# Patient Record
Sex: FEMALE | Race: WHITE
[De-identification: names, ages, dates, MRNs, and addresses within clinical notes are randomized per-mention and may not be internally consistent; named-entity substitution may affect disease eponyms.]

---

## 2018-07-11 ENCOUNTER — HOSPITAL ENCOUNTER (EMERGENCY)
Dept: HOSPITAL 59 - ER | Age: 28
LOS: 1 days | Discharge: HOME | End: 2018-07-12
Payer: MEDICAID

## 2018-07-11 DIAGNOSIS — S16.1XXA: ICD-10-CM
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
## 2018-07-11 DIAGNOSIS — R10.11: ICD-10-CM

## 2018-07-11 DIAGNOSIS — S06.0X9A: Primary | ICD-10-CM
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
## 2018-07-11 DIAGNOSIS — S29.012A: ICD-10-CM

## 2018-07-11 LAB
ALBUMIN SERPL-MCNC: 4.1 G/DL (ref 4–5)
ALBUMIN/GLOB SERPL: 1.6 {RATIO} (ref 1.1–1.8)
ALP SERPL-CCNC: 55 U/L (ref 35–104)
ALT SERPL-CCNC: 13 U/L (ref ?–33)
ANION GAP SERPL CALC-SCNC: 13 MMOL/L (ref 7–16)
APTT BLD: 28.7 SECONDS (ref 24.5–39.1)
AST SERPL-CCNC: 18 U/L (ref 10–35)
BASOPHILS NFR BLD: 0.1 % (ref 0–6)
BILIRUB SERPL-MCNC: 0.3 MG/DL (ref 0.2–1)
BUN SERPL-MCNC: 12 MG/DL (ref 6–20)
CO2 SERPL-SCNC: 23 MMOL/L (ref 22–29)
CREAT SERPL-MCNC: 0.5 MG/DL (ref 0.5–0.9)
EOSINOPHIL NFR BLD: 0.8 % (ref 0–6)
ERYTHROCYTE [DISTWIDTH] IN BLOOD BY AUTOMATED COUNT: 12.5 % (ref 11.5–14.5)
EST GLOMERULAR FILTRATION RATE: > 60 ML/MIN
GLOBULIN SER-MCNC: 2.6 GM/DL (ref 1.4–4.8)
GLUCOSE SERPL-MCNC: 154 MG/DL (ref 74–109)
GRANULOCYTES NFR BLD: 67.4 % (ref 47–80)
HCT VFR BLD CALC: 33.5 % (ref 35–47)
HGB BLD-MCNC: 11.2 GM/DL (ref 11.6–16)
INR PPP: 1
LYMPHOCYTES NFR BLD AUTO: 25.2 % (ref 16–45)
MCH RBC QN AUTO: 33.1 PG (ref 27–33)
MCHC RBC AUTO-ENTMCNC: 33.4 G/DL (ref 32–36)
MCV RBC AUTO: 99.1 FL (ref 81–97)
MONOCYTES NFR BLD: 6.5 % (ref 0–9)
PLATELET # BLD: 142 K/UL (ref 130–400)
PMV BLD AUTO: 10 FL (ref 7.4–10.4)
PROT SERPL-MCNC: 6.7 G/DL (ref 6.6–8.7)
PROTHROMBIN TIME: 10.7 SECONDS (ref 9.5–12.1)
RBC # BLD AUTO: 3.38 M/UL (ref 3.8–5.4)
WBC # BLD AUTO: 7.1 K/UL (ref 4.2–12.2)

## 2018-07-11 PROCEDURE — 85730 THROMBOPLASTIN TIME PARTIAL: CPT

## 2018-07-11 PROCEDURE — 80053 COMPREHEN METABOLIC PANEL: CPT

## 2018-07-11 PROCEDURE — 71260 CT THORAX DX C+: CPT

## 2018-07-11 PROCEDURE — 72125 CT NECK SPINE W/O DYE: CPT

## 2018-07-11 PROCEDURE — 96374 THER/PROPH/DIAG INJ IV PUSH: CPT

## 2018-07-11 PROCEDURE — 96361 HYDRATE IV INFUSION ADD-ON: CPT

## 2018-07-11 PROCEDURE — 74177 CT ABD & PELVIS W/CONTRAST: CPT

## 2018-07-11 PROCEDURE — 84703 CHORIONIC GONADOTROPIN ASSAY: CPT

## 2018-07-11 PROCEDURE — 85610 PROTHROMBIN TIME: CPT

## 2018-07-11 PROCEDURE — 99284 EMERGENCY DEPT VISIT MOD MDM: CPT

## 2018-07-11 PROCEDURE — 70450 CT HEAD/BRAIN W/O DYE: CPT

## 2018-07-11 PROCEDURE — 85025 COMPLETE CBC W/AUTO DIFF WBC: CPT
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
## 2018-09-03 ENCOUNTER — HOSPITAL ENCOUNTER (EMERGENCY)
Dept: HOSPITAL 59 - ER | Age: 28
Discharge: HOME | End: 2018-09-03
Payer: MEDICARE

## 2018-09-03 DIAGNOSIS — K08.89: Primary | ICD-10-CM

## 2018-09-03 DIAGNOSIS — K08.409: ICD-10-CM

## 2018-09-03 DIAGNOSIS — Z87.891: ICD-10-CM

## 2018-09-03 LAB
BASOPHILS NFR BLD: 0.1 % (ref 0–6)
EOSINOPHIL NFR BLD: 0.4 % (ref 0–6)
ERYTHROCYTE [DISTWIDTH] IN BLOOD BY AUTOMATED COUNT: 12.7 % (ref 11.5–14.5)
GRANULOCYTES NFR BLD: 66.9 % (ref 47–80)
HCT VFR BLD CALC: 37.9 % (ref 35–47)
HGB BLD-MCNC: 12.3 GM/DL (ref 11.6–16)
LYMPHOCYTES NFR BLD AUTO: 25.1 % (ref 16–45)
MCH RBC QN AUTO: 31.8 PG (ref 27–33)
MCHC RBC AUTO-ENTMCNC: 32.5 G/DL (ref 32–36)
MCV RBC AUTO: 97.9 FL (ref 81–97)
MONOCYTES NFR BLD: 7.5 % (ref 0–9)
PLATELET # BLD: 152 K/UL (ref 130–400)
PMV BLD AUTO: 10.1 FL (ref 7.4–10.4)
RBC # BLD AUTO: 3.87 M/UL (ref 3.8–5.4)
WBC # BLD AUTO: 6.7 K/UL (ref 4.2–12.2)

## 2018-09-03 PROCEDURE — 99283 EMERGENCY DEPT VISIT LOW MDM: CPT

## 2018-09-03 PROCEDURE — 85025 COMPLETE CBC W/AUTO DIFF WBC: CPT
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
## 2018-12-16 ENCOUNTER — HOSPITAL ENCOUNTER (EMERGENCY)
Dept: HOSPITAL 59 - ER | Age: 28
Discharge: HOME | End: 2018-12-16
Payer: MEDICARE

## 2018-12-16 DIAGNOSIS — J01.00: Primary | ICD-10-CM

## 2018-12-16 DIAGNOSIS — R51: ICD-10-CM

## 2018-12-16 DIAGNOSIS — Z87.891: ICD-10-CM

## 2018-12-16 LAB
ALBUMIN SERPL-MCNC: 4.4 G/DL (ref 4–5)
ALBUMIN/GLOB SERPL: 1.3 {RATIO} (ref 1.1–1.8)
ALP SERPL-CCNC: 62 U/L (ref 35–104)
ALT SERPL-CCNC: 30 U/L (ref ?–33)
ANION GAP SERPL CALC-SCNC: 15 MMOL/L (ref 7–16)
AST SERPL-CCNC: 33 U/L (ref 10–35)
BASOPHILS NFR BLD: 0.2 % (ref 0–6)
BILIRUB SERPL-MCNC: 0.2 MG/DL (ref 0.2–1)
BUN SERPL-MCNC: 10 MG/DL (ref 6–20)
CO2 SERPL-SCNC: 27 MMOL/L (ref 22–29)
CREAT SERPL-MCNC: 0.6 MG/DL (ref 0.5–0.9)
EOSINOPHIL NFR BLD: 0.2 % (ref 0–6)
ERYTHROCYTE [DISTWIDTH] IN BLOOD BY AUTOMATED COUNT: 13.4 % (ref 11.5–14.5)
EST GLOMERULAR FILTRATION RATE: > 60 ML/MIN
GLOBULIN SER-MCNC: 3.5 GM/DL (ref 1.4–4.8)
GLUCOSE SERPL-MCNC: 88 MG/DL (ref 74–109)
GRANULOCYTES NFR BLD: 72.2 % (ref 47–80)
HCT VFR BLD CALC: 38.7 % (ref 35–47)
HGB BLD-MCNC: 12.3 GM/DL (ref 11.6–16)
LYMPHOCYTES NFR BLD AUTO: 21.2 % (ref 16–45)
MCH RBC QN AUTO: 31.3 PG (ref 27–33)
MCHC RBC AUTO-ENTMCNC: 31.8 G/DL (ref 32–36)
MCV RBC AUTO: 98.5 FL (ref 81–97)
MONOCYTES NFR BLD: 6.2 % (ref 0–9)
PLATELET # BLD: 243 K/UL (ref 130–400)
PMV BLD AUTO: 9.3 FL (ref 7.4–10.4)
PROT SERPL-MCNC: 7.9 G/DL (ref 6.6–8.7)
RBC # BLD AUTO: 3.93 M/UL (ref 3.8–5.4)
WBC # BLD AUTO: 10.3 K/UL (ref 4.2–12.2)

## 2018-12-16 PROCEDURE — 80053 COMPREHEN METABOLIC PANEL: CPT

## 2018-12-16 PROCEDURE — 96375 TX/PRO/DX INJ NEW DRUG ADDON: CPT

## 2018-12-16 PROCEDURE — 70486 CT MAXILLOFACIAL W/O DYE: CPT

## 2018-12-16 PROCEDURE — 96361 HYDRATE IV INFUSION ADD-ON: CPT

## 2018-12-16 PROCEDURE — 85025 COMPLETE CBC W/AUTO DIFF WBC: CPT

## 2018-12-16 PROCEDURE — 99284 EMERGENCY DEPT VISIT MOD MDM: CPT

## 2018-12-16 PROCEDURE — 96374 THER/PROPH/DIAG INJ IV PUSH: CPT

## 2018-12-16 RX ADMIN — KETOROLAC TROMETHAMINE ONE MG: 30 INJECTION, SOLUTION INTRAMUSCULAR; INTRAVENOUS at 18:55

## 2018-12-16 RX ADMIN — METOCLOPRAMIDE ONE MG: 5 INJECTION, SOLUTION INTRAMUSCULAR; INTRAVENOUS at 18:57

## 2018-12-16 RX ADMIN — AMOXICILLIN AND CLAVULANATE POTASSIUM ONE EACH: 875; 125 TABLET, FILM COATED ORAL at 20:17

## 2018-12-16 RX ADMIN — DIPHENHYDRAMINE HYDROCHLORIDE ONE MG: 50 INJECTION, SOLUTION INTRAMUSCULAR; INTRAVENOUS at 18:53

## 2018-12-16 RX ADMIN — SODIUM CHLORIDE SCH MLS/HR: 0.9 INJECTION, SOLUTION INTRAVENOUS at 18:54
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
## 2019-02-27 ENCOUNTER — HOSPITAL ENCOUNTER (EMERGENCY)
Dept: HOSPITAL 59 - ER | Age: 29
Discharge: HOME | End: 2019-02-27
Payer: MEDICARE

## 2019-02-27 DIAGNOSIS — J01.00: Primary | ICD-10-CM

## 2019-02-27 LAB
ANION GAP SERPL CALC-SCNC: 8 MMOL/L (ref 7–16)
BASOPHILS NFR BLD: 0.7 % (ref 0–6)
BUN SERPL-MCNC: 15 MG/DL (ref 6–20)
CO2 SERPL-SCNC: 28 MMOL/L (ref 22–29)
CREAT SERPL-MCNC: 0.5 MG/DL (ref 0.5–0.9)
EOSINOPHIL NFR BLD: 0.7 % (ref 0–6)
ERYTHROCYTE [DISTWIDTH] IN BLOOD BY AUTOMATED COUNT: 13.6 % (ref 11.5–14.5)
EST GLOMERULAR FILTRATION RATE: > 60 ML/MIN
GLUCOSE SERPL-MCNC: 80 MG/DL (ref 74–109)
GRANULOCYTES NFR BLD: 65.6 % (ref 47–80)
HCT VFR BLD CALC: 38.7 % (ref 35–47)
HGB BLD-MCNC: 12.5 GM/DL (ref 11.6–16)
LYMPHOCYTES NFR BLD AUTO: 25.5 % (ref 16–45)
MCH RBC QN AUTO: 31.5 PG (ref 27–33)
MCHC RBC AUTO-ENTMCNC: 32.3 G/DL (ref 32–36)
MCV RBC AUTO: 97.5 FL (ref 81–97)
MONOCYTES NFR BLD: 7.5 % (ref 0–9)
PLATELET # BLD: 181 K/UL (ref 130–400)
PMV BLD AUTO: 10.5 FL (ref 7.4–10.4)
RBC # BLD AUTO: 3.97 M/UL (ref 3.8–5.4)
WBC # BLD AUTO: 7.3 K/UL (ref 4.2–12.2)

## 2019-02-27 PROCEDURE — 99283 EMERGENCY DEPT VISIT LOW MDM: CPT

## 2019-02-27 PROCEDURE — 80048 BASIC METABOLIC PNL TOTAL CA: CPT

## 2019-02-27 PROCEDURE — 85025 COMPLETE CBC W/AUTO DIFF WBC: CPT

## 2019-02-27 PROCEDURE — 70486 CT MAXILLOFACIAL W/O DYE: CPT

## 2019-02-27 PROCEDURE — 99284 EMERGENCY DEPT VISIT MOD MDM: CPT

## 2019-02-27 NOTE — EMERGENCY DEPARTMENT RECORD
History of Present Illness





- General


Chief complaint: ENT


Stated complaint: MOUTH INFECTION


Time Seen by Provider: 19 18:41


Source: Patient


Mode of Arrival: Ambulatory


Limitations: No limitations





- History of Present Illness


Initial comments: 





pt states she has a hx of fxd sinus last year and has been having problems ever 

since.  she has not been able to have a specialist evaluate it.  she has had 1 

course of augmenti.  she still has pain in the area and drainage which is foul 

tasting.  it makes her feel unwell


Onset/Timin


-: Month(s)


Severity: Moderate


Severity scale (1-10): 6


Quality: Aching


Consistency: Constant


Improves with: Other


Worsens with: Eating, Movement, Other





- Related Data


 Previous Rx's











 Medication  Instructions  Recorded


 


Doxycycline Hyclate 100 mg PO BID #20 tab.dr 19


 


Ibuprofen [Motrin] 800 mg PO Q8H PRN #20 tab 19











 Allergies











Allergy/AdvReac Type Severity Reaction Status Date / Time


 


No Known Drug Allergies Allergy   Verified 19 16:17














Travel Screening





- Travel/Exposure Within Last 30 Days


Have you traveled within the last 30 days?: No





- Travel/Exposure Within Last Year


Have you traveled outside the [place] in the last year?: No





- Additonal Travel Details


Have you been exposed to anyone with a communicable illness?: No





- Travel Symptoms


Symptom Screening: None





Review of Systems


Reviewed: No additional complaints except as noted below


Constitutional: Reports: As per HPI.  Denies: Chills, Fever, Malaise, Night 

sweats, Weakness, Weight change


Eyes: Reports: As per HPI.  Denies: Eye discharge, Eye pain, Photophobia, 

Vision change


ENT: Reports: As per HPI, Congestion.  Denies: Dental pain, Ear pain, Epistaxis

, Hearing loss, Throat pain


Respiratory: Reports: As per HPI.  Denies: Cough, Dyspnea, Hemoptysis, Stridor, 

Wheezes


Cardiovascular: Reports: As per HPI.  Denies: Arrhythmia, Chest pain, Dyspnea 

on exertion, Edema, Murmurs, Orthopnea, Palpitations, Paroxysmal nocturnal 

dyspnea, Rheumatic Fever, Syncope


Endocrine: Reports: As per HPI.  Denies: Fatigue, Heat or cold intolerance, 

Polydipsia, Polyuria


Gastrointestinal: Reports: As per HPI.  Denies: Abdominal pain, Constipation, 

Diarrhea, Hematemesis, Hematochezia, Melena, Nausea, Vomiting


Genitourinary: Reports: As per HPI.  Denies: Abnormal menses, Discharge, 

Dyspareunia, Dysuria, Frequency, Hematuria, Incontinence, Retention, Urgency


Musculoskeletal: Reports: As per HPI.  Denies: Arthralgia, Back pain, Gout, 

Joint swelling, Myalgia, Neck pain


Skin: Reports: As per HPI.  Denies: Bruising, Change in color, Change in hair/

nails, Lesions, Pruritus, Rash


Neurological: Reports: As per HPI.  Denies: Abnormal gait, Confusion, Headache, 

Numbness, Paresthesias, Seizure, Tingling, Tremors, Vertigo, Weakness


Psychiatric: Reports: As per HPI.  Denies: Anxiety, Auditory hallucinations, 

Depression, Homicidal thoughts, Suicidal thoughts, Visual hallucinations


Hematological/Lymphatic: Reports: As per HPI.  Denies: Anemia, Blood Clots, 

Easy bleeding, Easy bruising, Swollen glands





Past Medical History





- SOCIAL HISTORY


Smoking Status: Never smoker


Alcohol Use: None


Drug Use: Heavy


Drug Use Detail:: Marijuana





- RESPIRATORY


Hx Respiratory Disorders: No


Comment:: murmur





- CARDIOVASCULAR


Hx Cardio Disorders: No





- NEURO


Hx Neuro Disorders: No


Comment:: "possible brain tumor vs thryoid issue.", memory issue





- GI


Hx GI Disorders: No





- 


Hx Genitourinary Disorders: No


Comment:: kidney infections.





- ENDOCRINE


Hx Endocrine Disorders: No


Hx Thyroid Disease: Yes





- MUSCULOSKELETAL


Hx Musculoskeletal Disorders: Yes


Hx Arthritis: Yes


Comment:: scoliosis and muscle spasm. joint disorder





- PSYCH


Hx Psych Problems: No


Hx Anxiety: Yes


Hx Depression: Yes


Comment:: bi-polar





- HEMATOLOGY/ONCOLOGY


Hx Hematology/Oncology Disorders: No


Comment:: reynauld's disease conneective tissue disorder





Family Medical History


Any Significant Family History?: Yes


Family Hx Comment (NOT TO BE USED IN PLACE OF ITEMS BELOW): mother - arthritis, 

thyroid issue


*Cancer Comment: aunt breast ca


Hx Diabetes: Father





Physical Exam





- General


General Appearance: Alert, Oriented x3, Cooperative, Mild distress





- Head


Head exam: Normal inspection





- Eye


Eye exam: Normal appearance, PERRL, EOMI


Pupils: Normal accommodation





- ENT


ENT exam: Normal exam, Mucous membranes moist, Normal external ear exam, Normal 

orophraynx, TM's normal bilaterally


Ear exam: Normal external inspection.  negative: External canal tenderness


Nasal Exam: Sinus tenderness.  negative: Discharge


Mouth exam: Normal external inspection, Tongue normal


Teeth exam: Normal inspection.  negative: Dental caries


Throat exam: Normal inspection.  negative: Tonsillar erythema, Tonsillar exudate





- Neck


Neck exam: Normal inspection, Full ROM.  negative: Tenderness





- Respiratory


Respiratory exam: Normal lung sounds bilaterally.  negative: Respiratory 

distress





- Cardiovascular


Cardiovascular Exam: Regular rate, Normal rhythm, Normal heart sounds





- GI/Abdominal


GI/Abdominal exam: Soft, Normal bowel sounds.  negative: Tenderness





- Rectal


Rectal exam: Deferred





- 


 exam: Deferred





- Extremities


Extremities exam: Normal inspection, Full ROM, Normal capillary refill.  

negative: Tenderness





- Back


Back exam: Reports: Normal inspection, Full ROM.  Denies: Muscle spasm, Rash 

noted, Tenderness





- Neurological


Neurological exam: Alert, CN II-XII intact, Normal gait, Oriented X3





- Psychiatric


Psychiatric exam: Normal affect, Normal mood





- Skin


Skin exam: Dry, Intact, Normal color, Warm





Course





 Vital Signs











  19





  16:19


 


Temperature 98.2 F


 


Pulse Rate 72


 


Respiratory 18





Rate 


 


Blood Pressure 100/64


 


Pulse Ox 100














- Reevaluation(s)


Reevaluation #1: 





19 20:16


ct shows opacification of maxillary sinus and previous injury





Medical Decision Making





- Lab Data


Result diagrams: 


 19 19:07





 19 19:07





 Lab Results











  19 Range/Units





  19:07 19:07 


 


WBC  7.3   (4.2-12.2)  K/uL


 


RBC  3.97   (3.80-5.40)  M/uL


 


Hgb  12.5   (11.6-16.0)  gm/dl


 


Hct  38.7   (35.0-47.0)  %


 


MCV  97.5 H   (81-97)  fl


 


MCH  31.5   (27-33)  pg


 


MCHC  32.3   (32-36)  g/dl


 


RDW  13.6   (11.5-14.5)  %


 


Plt Count  181   (130-400)  K/uL


 


MPV  10.5 H   (7.4-10.4)  fl


 


Gran %  65.6   (47-80)  %


 


Lymphocytes %  25.5   (16-45)  %


 


Monocytes %  7.5   (0-9)  %


 


Eosinophils %  0.7   (0-6)  %


 


Basophils %  0.7   (0-6)  %


 


Sodium   142  (136-145)  mmol/L


 


Potassium   4.0  (3.4-4.5)  mmol/L


 


Chloride   106  ()  mmol/L


 


Carbon Dioxide   28.0  (22-29)  mmol/L


 


Anion Gap   8.0  (7-16)  


 


BUN   15  (6-20)  mg/dL


 


Creatinine   0.5  (0.5-0.9)  mg/dL


 


Estimated GFR   > 60  mL/min


 


Random Glucose   80  ()  mg/dL


 


Calcium   9.3  (8.6-10.0)  mg/dL














Disposition


Disposition: Discharge


Clinical Impression: 


Sinusitis


Qualifiers:


 Sinusitis location: maxillary Chronicity: subacute Qualified Code(s): J01.00 - 

Acute maxillary sinusitis, unspecified





Disposition: Home, Self-Care


Condition: (1) Good


Instructions:  Sinusitis (ED), Warm Compress or Soak (ED)


Additional Instructions: 


follow up with ENT doctor.  return sooner if worse


Prescriptions: 


Doxycycline Hyclate 100 mg PO BID #20 tab.dr


Ibuprofen [Motrin] 800 mg PO Q8H PRN #20 tab


 PRN Reason: Pain - Mild To Moderate (1-7)


Referrals: 


RICH NARAYAN [DOCTOR OF OSTEOPATH] - 





Quality





- Quality Measures


Quality Measures: N/A





- Blood Pressure Screening


Does Patient Have Any of the Following: No


Blood Pressure Classification: Normal BP Reading


Systolic Measurement: 100


Diastolic Measurement: 64


Screening for High Blood Pressure: < Normal BP, F/U Not Required > []

## 2019-02-28 NOTE — CT SCAN REPORT
EXAM:  EMERGENCY MAXILLOFACIAL BONE CT WITHOUT CONTRAST 



HISTORY:  BROKEN FACE SINCE OCTOBER, HAS BROKEN SINUS.  



TECHNIQUE:  Axial CT scan of the maxillofacial bones were performed without IV 
contrast.  



Comparison:  Maxillofacial bone CT 12/16/18.  



Encounter:  Subsequent.  



FINDINGS:  There is extensive opacification in the visualized left frontal 
sinus similar to before.  Extensive opacification in the left ethmoid sinus 
although slightly improved from the prior study particularly posteriorly.  The 
right frontal and right ethmoid sinus appear essentially clear.  Minor membrane 
thickening anteriorly in the left sphenoid sinus.  The right sphenoid sinus is 
clear.  Continued almost complete opacification of the left maxillary sinus.  
Absence of the left first maxillary molar with a small dense fragment within 
the residual cavity as previously noted and with contiguous defect between the 
region of this absent tooth and the overlying maxillary sinus as before.  The 
medial wall between the maxillary sinus and nasal cavity also not clearly seen 
at the level of the middle turbinate, as was the case previously as well.  



No actual discrete facial bone fracture identified, as was the case previously 
as well.  Since the prior exam the left maxillary second molar has been 
removed.  The left maxillary third molar was absent both previously and 
currently.  The right sided maxillary molars are also absent currently as well 
as previously.  No abnormal air collection seen within either orbit.  



IMPRESSION:  

1.  RESECTION OF THE LEFT MAXILLARY SECOND MOLAR SINCE THE PRIOR 12/16/18 CT.  



2.  PERSISTENT ALMOST COMPLETE OPACIFICATION OF THE LEFT MAXILLARY SINUS WITH 
THE DEFECT CONTIGUOUS WITH THE LEFT MAXILLARY FIRST MOLAR TOOTH SOCKET WITH A 
SMALL CALCIFIC DENSITY IN THE SOCKET AS BEFORE WHICH MAY BE A SMALL TOOTH 
FRAGMENT AS PREVIOUSLY DESCRIBED.  THE MEDIAL WALL OF THE LEFT MAXILLARY SINUS 
AT THE LEVEL OF THE NASAL CAVITY ALSO AGAIN POORLY DELINEATED AS BEFORE.  



3.  STILL PROMINENT OPACIFICATION IN THE LEFT FRONTAL SINUS AS BEFORE AND IN 
THE MAJORITY OF THE LEFT ETHMOID SINUS ALTHOUGH PARTIAL CLEARING POSTERIORLY 
COMPARED WITH THE PRIOR STUDY.   THE RIGHT ETHMOID SINUS HAS CLEARED FROM THE 
PRIOR STUDY AS WELL.  



4.  NO DEFINITE ACTUAL FACIAL BONE FRACTURE IDENTIFIED.  



JOB NUMBER:  [id number]
[facility]